# Patient Record
Sex: FEMALE | Race: WHITE | NOT HISPANIC OR LATINO | Employment: OTHER | URBAN - METROPOLITAN AREA
[De-identification: names, ages, dates, MRNs, and addresses within clinical notes are randomized per-mention and may not be internally consistent; named-entity substitution may affect disease eponyms.]

---

## 2023-07-10 ENCOUNTER — TELEPHONE (OUTPATIENT)
Dept: BEHAVIORAL HEALTH | Facility: CLINIC | Age: 18
End: 2023-07-10

## 2023-07-10 ENCOUNTER — HOSPITAL ENCOUNTER (EMERGENCY)
Facility: CLINIC | Age: 18
Discharge: PSYCHIATRIC OR SUBSTANCE USE FACILITY | End: 2023-07-12
Attending: EMERGENCY MEDICINE | Admitting: EMERGENCY MEDICINE

## 2023-07-10 DIAGNOSIS — R45.851 DEPRESSION WITH SUICIDAL IDEATION: ICD-10-CM

## 2023-07-10 DIAGNOSIS — F32.A DEPRESSION WITH SUICIDAL IDEATION: ICD-10-CM

## 2023-07-10 PROCEDURE — 90791 PSYCH DIAGNOSTIC EVALUATION: CPT

## 2023-07-10 PROCEDURE — 99285 EMERGENCY DEPT VISIT HI MDM: CPT | Mod: 25

## 2023-07-10 ASSESSMENT — ACTIVITIES OF DAILY LIVING (ADL)
ADLS_ACUITY_SCORE: 35
ADLS_ACUITY_SCORE: 35

## 2023-07-10 ASSESSMENT — COLUMBIA-SUICIDE SEVERITY RATING SCALE - C-SSRS
LETHALITY/MEDICAL DAMAGE CODE MOST RECENT POTENTIAL ATTEMPT: BEHAVIOR LIKELY TO RESULT IN INJURY BUT NOT LIKELY TO CAUSE DEATH
TOTAL  NUMBER OF ABORTED OR SELF INTERRUPTED ATTEMPTS LIFETIME: NO
2. HAVE YOU ACTUALLY HAD ANY THOUGHTS OF KILLING YOURSELF?: YES
1. HAVE YOU WISHED YOU WERE DEAD OR WISHED YOU COULD GO TO SLEEP AND NOT WAKE UP?: YES
4. HAVE YOU HAD THESE THOUGHTS AND HAD SOME INTENTION OF ACTING ON THEM?: YES
3. HAVE YOU BEEN THINKING ABOUT HOW YOU MIGHT KILL YOURSELF?: YES
2. HAVE YOU ACTUALLY HAD ANY THOUGHTS OF KILLING YOURSELF?: YES
LETHALITY/MEDICAL DAMAGE CODE MOST RECENT ACTUAL ATTEMPT: MINOR PHYSICAL DAMAGE
4. HAVE YOU HAD THESE THOUGHTS AND HAD SOME INTENTION OF ACTING ON THEM?: YES
MOST RECENT DATE: 66565
TOTAL  NUMBER OF ACTUAL ATTEMPTS PAST 3 MONTHS: 1
5. HAVE YOU STARTED TO WORK OUT OR WORKED OUT THE DETAILS OF HOW TO KILL YOURSELF? DO YOU INTEND TO CARRY OUT THIS PLAN?: YES
6. HAVE YOU EVER DONE ANYTHING, STARTED TO DO ANYTHING, OR PREPARED TO DO ANYTHING TO END YOUR LIFE?: NO
5. HAVE YOU STARTED TO WORK OUT OR WORKED OUT THE DETAILS OF HOW TO KILL YOURSELF? DO YOU INTEND TO CARRY OUT THIS PLAN?: YES
1. IN THE PAST MONTH, HAVE YOU WISHED YOU WERE DEAD OR WISHED YOU COULD GO TO SLEEP AND NOT WAKE UP?: YES
TOTAL  NUMBER OF INTERRUPTED ATTEMPTS LIFETIME: NO
ATTEMPT LIFETIME: YES
ATTEMPT PAST THREE MONTHS: YES

## 2023-07-11 ENCOUNTER — TELEPHONE (OUTPATIENT)
Dept: BEHAVIORAL HEALTH | Facility: CLINIC | Age: 18
End: 2023-07-11

## 2023-07-11 LAB
AMPHETAMINES UR QL SCN: ABNORMAL
BARBITURATES UR QL SCN: ABNORMAL
BENZODIAZ UR QL SCN: ABNORMAL
BZE UR QL SCN: ABNORMAL
CANNABINOIDS UR QL SCN: ABNORMAL
HCG UR QL: NEGATIVE
OPIATES UR QL SCN: ABNORMAL
SARS-COV-2 RNA RESP QL NAA+PROBE: NEGATIVE

## 2023-07-11 PROCEDURE — 81025 URINE PREGNANCY TEST: CPT | Performed by: SOCIAL WORKER

## 2023-07-11 PROCEDURE — 80307 DRUG TEST PRSMV CHEM ANLYZR: CPT | Performed by: EMERGENCY MEDICINE

## 2023-07-11 PROCEDURE — 87635 SARS-COV-2 COVID-19 AMP PRB: CPT | Performed by: EMERGENCY MEDICINE

## 2023-07-11 PROCEDURE — 250N000013 HC RX MED GY IP 250 OP 250 PS 637: Performed by: EMERGENCY MEDICINE

## 2023-07-11 RX ORDER — TESTOSTERONE CYPIONATE 200 MG/ML
0.3 INJECTION, SOLUTION INTRAMUSCULAR WEEKLY
COMMUNITY

## 2023-07-11 RX ADMIN — NICOTINE 1 PATCH: 7 PATCH, EXTENDED RELEASE TRANSDERMAL at 02:41

## 2023-07-11 ASSESSMENT — ACTIVITIES OF DAILY LIVING (ADL)
ADLS_ACUITY_SCORE: 35

## 2023-07-11 NOTE — ED NOTES
"Triage & Transition Services, Extended Care     Therapy Progress Note    Patient: Aayush goes by \"Aayush,\" uses he/him pronouns  Date of Service: July 11, 2023  Site of Service: [facility] ED  Patient was seen in-person.     Presenting problem:   Aayush is followed related to Long wait time for admission: 48 hours. Please see initial DEC/[facility] Crisis Assessment completed by Raquel De Los Santos on 7/10/23 for complete assessment information. Notable concerns include unrelenting suicidal ideation..     Individuals Present: Aayush & Ghulam Ames    Session start: 3:45pm  Session end: 4:10pm  Session duration in minutes: 20  Session number: 1  Anticipated number of sessions or this episode of care: 3  CPT utilized: 81558 - Psychotherapy for Crisis - 60 (30-74*) min    Current Presentation:   Patient was in room with their two boyfriends who had brought lunch. Patient was pleasant, oriented X4, cooperative and conversational.  Patient is being seen following a long admission wait time. Patient continued to endorse admission     Mental Status Exam:   Appearance: awake, alert, adequately groomed and appeared as age stated  Attitude: cooperative  Eye Contact: good  Mood: good  Affect: appropriate and in normal range  Speech: clear, coherent  Psychomotor Behavior: no evidence of tardive dyskinesia, dystonia, or tics and all within normal impression  Thought Process:  logical and goal oriented  Associations: no loose associations  Thought Content: passive suicidal ideation present, no auditory hallucinations present and no visual hallucinations present  Insight: good  Judgement: intact  Oriented to: time, person, and place  Attention Span and Concentration: intact  Recent and Remote Memory: intact    Diagnosis:   311 (F32.9) Unspecified Depressive Disorder      Therapeutic Intervention(s):   Provided active listening, unconditional positive regard, and validation. Provided positive reinforcement for progress towards goals, gains in " knowledge, and application of skills previously taught.  Explored motivation for continued treatment efforts to address current and ongoing mental health concerns. .    Treatment Objective(s) Addressed:   The focus of this session was on rapport building and processing feelings related to long wait time in the ED.     Progress Towards Goals:   Patient reports stable symptoms. Patient is making progress towards treatment goals as evidenced by being mindful of their current placement, discussing their recognition of the need to seek help. Patient is content with the wait and expressed no concerns, and feels they have been kept informed of placement status.      Case Management:   Checked in with patient regarding their wait for admission and any concerns they had in that or any other regard of their current stay in the ED. Patient was content and endorsed continued admission for mental health.      General Recommendations:   Continue to monitor for harm. Consider: Listen in a neutral, non-judgmental way. Offer reassurance    Plan:   Inpatient Mental Health: Patient is reporting current thoughts of suicide with plan to overdose. Unable to engage in safety planning.. Continued desire to admit to [facility] for safety and stabilization.      Plan for Care reviewed with Assigned Medical Provider? Yes. Provider, Dr JESSICA Zarco MD, response: positive. Also update the HUC so as to inform the RN and new attending.     Ghulam Ames MA  Licensed Mental Health Professional (LMHP), [facility]  [phone]

## 2023-07-11 NOTE — PLAN OF CARE
Karine Juarez  July 10, 2023  Plan of Care Hand-off Note     Patient Care Path: Inpatient Mental Health    Plan for Care: Pt is reporting current thoughts of suicide with plan to overdose. Unable to engage in safety planning with this writer. Desires to admit to IP MH unit for safety and stabilization.         Critical Safety Issues: none    Overview:  This patient is a child/adolescent: No    This patient has additional special visitor precautions: No    Legal Status: Voluntary    Contacts:    none    Psychiatry Consult:  Adult Psychiatry Consult requested related to awaiting IP MH admission. Psychiatry IP Consult Order Placed: Yes    Updated Attending Provider regarding plan of care.    Raquel De Los Santos, LICSW

## 2023-07-11 NOTE — ED NOTES
RN ED Mental Health Handoff Note    Voluntary    Does patient require 1:1? Yes    Hold and rights been given and documented for patient: No    Is the patient in BH scrubs? No -requested to remain in street clothes.     Has the patient been searched? Yes    Is the 15 minute observation tool up to date? Yes    Was patient issued a welcome folder? Yes    Room check completed this shift: Yes    PSS3 and [place] Assessment/Reassessment this shift:    PSS-3    Date and Time Over the past 2 weeks have you felt down, depressed, or hopeless? Over the past 2 weeks have you had thoughts of killing yourself? Have you ever attempted to kill yourself? When did this last happen? User   07/10/23 1902 yes yes no -- [facility]      C-SSRS ([place])    Date and Time Q1 Wished to be Dead (Past Month) Q2 Suicidal Thoughts (Past Month) Q3 Suicidal Thought Method Q4 Suicidal Intent without Specific Plan Q5 Suicide Intent with Specific Plan Q6 Suicide Behavior (Lifetime) Within the Past 3 Months? RETIRED: Level of Risk per Screen Screening Not Complete User   07/10/23 1902 yes yes no yes no yes -- -- -- [facility]          Behavioral status of patient: Green    Code 21 called this shift? No    Use of restraints/seclusion this shift? No    Most recent vital signs:  Temp: 98.4  F (36.9  C) Temp src: Oral BP: (!) 137/96 Pulse: 82   Resp: 20 SpO2: 98 % O2 Device: None (Room air)      Medications:  Scheduled medication compliance? Yes    PRN Meds administered this shift? No    Medications   nicotine Patch in Place ( Transdermal Patch in Place 7/11/23 0638)   nicotine (NICODERM CQ) 7 MG/24HR 24 hr patch 1 patch ( Transdermal Canceled Entry 7/11/23 0801)         ADLs    Meal Provided this shift? Yes    Hygiene items provided? No    ADLs completed? Yes    Date of last shower: Unknown    Any significant events this shift? No    Any information that would be helpful in caring for this patient?  Patient remains calm, cooperative.     Family present/updated?  No    Location of patient's belongings: With patient.     Critical Care Minutes:  Does the patient need critical care minutes documented? No

## 2023-07-11 NOTE — ED PROVIDER NOTES
Received this patient in signout from day team.  Please refer to earlier documentation.  Patient reevaluated by LINDSEY.  No changes to current plan.  Patient was set up with one of the overnight shift pending inpatient bed availability.     Yves Valentin MD  07/11/23 5539

## 2023-07-11 NOTE — ED PROVIDER NOTES
History     Chief Complaint:  Mental Health Problem       HPI   Karine Juarez prefers to be called [facility] and is a 18 year old who presents emergency department with concerns for increasing depression, suicidal thoughts and safety issues.  He reports that he has struggled for years with suicidal thoughts and depression but has not sought professional care.  He notes that recently he has had increasing thoughts and more of a struggle especially when alone with feeling as though he is safe and so presents emergency department tonight for assessment.  He has never been hospitalized or taken any medications for depression.  He reports he takes testosterone and no other medications.  He denies any recent ingestions with intent to harm or other self-harm.  He has 2 supportive boyfriends who encouraged him to come to the emergency department tonight for assessment.  He is here voluntarily.      Independent Historian:   None - Patient Only    Review of External Notes:   No outside clinic notes for review      Medications:    Testosterone injection weekly    Past Medical History:    No chronic medical problems    Past Surgical History:    No past surgical history on file.     Physical Exam   Patient Vitals for the past 24 hrs:   BP Temp Temp src Pulse Resp SpO2   07/10/23 1903 (!) 160/98 97.7  F (36.5  C) Temporal 98 20 98 %        Physical Exam  General: Adult sitting upright  Eyes: No scleral icterus  ENT: Moist mucous membranes.  CV: Normal S1S2, no murmur, rub or gallop. Regular rate and rhythm  Resp: Clear to auscultation bilaterally, no wheezes, rales or rhonchi. Normal respiratory effort.  MSK: Normal active range of motion  Skin: Warm and dry.   Neuro: Alert and oriented. Responds appropriately to all questions and commands. No focal findings appreciated.   Psych: Depressed mood.  Good eye contact.    Emergency Department Course       Emergency Department Course & Assessments:    PSS-3    Date and Time Over  the past 2 weeks have you felt down, depressed, or hopeless? Over the past 2 weeks have you had thoughts of killing yourself? Have you ever attempted to kill yourself? When did this last happen? User   07/10/23 1902 yes yes no -- [facility]      C-SSRS ([place])    Date and Time Q1 Wished to be Dead (Past Month) Q2 Suicidal Thoughts (Past Month) Q3 Suicidal Thought Method Q4 Suicidal Intent without Specific Plan Q5 Suicide Intent with Specific Plan Q6 Suicide Behavior (Lifetime) Within the Past 3 Months? RETIRED: Level of Risk per Screen Screening Not Complete User   07/10/23 1902 yes yes no yes no yes -- -- -- [facility]              Suicide assessment completed by mental health (D.E.C., LCSW, etc.)      Assessments:   I performed an exam of the patient and obtained history, as documented above.  I reassessed the patient.  No new concerns.    Independent Interpretation (X-rays, CTs, rhythm strip):  None    Consultations/Discussion of Management or Tests:  I discussed the patient with DEC  who felt the patient would benefit from inpatient psychiatric care.    Social Determinants of Health affecting care:   None    Disposition:  The patient will board in the emergency department pending bed placement. Care was signed out to Dr. Marques.     Impression & Plan      Medical Decision Making:  Karine Juarez is a 18 year old who presents for evaluation of depressed mood with suicidal ideation.  There is a history of depression in the past, not formally diagnosed, not on medications or having any intervention by psychiatry or therapy.  He has no concerns from a standpoint of physical ailments and is medically cleared for further evaluation by psychiatry.  Given the aggressive worsening of symptoms, inability to contract for safety, and possible plan the patient is stated, admission would be recommended for further mental health care.    I did have DEC evaluate the patient, their recommendations are noted in separate  note.   They as well agree with the above recommendations.  At this time the patient is stable awaiting inpatient psychiatric bed.       Diagnosis:    ICD-10-CM    1. Depression with suicidal ideation  F32.A     R45.851            7/10/2023   Aysha Aguilar MD Jonkman, Tracy Dianne, MD  07/10/23 3288

## 2023-07-11 NOTE — ED TRIAGE NOTES
"Reports worsening depression and suicidal thoughts, without a plan x 2-3 weeks. VSS. ABCs intact. Present with boyfriends. Pt states \"I don't feel safe at home with myself.\"      "

## 2023-07-11 NOTE — TELEPHONE ENCOUNTER
R: METRO +2 HOURS    [facility] Access Inpatient Bed Call Log 7/11/23 7:05AM   Intake has called facilities that have not updated their bed status within the last 12 hours.                  Adults:                   [facility] is at capacity.                 [facility] is posting 0 beds.    [phone];                [facility] is posting 0 beds. Negative covid required.                    [facility] is posting 0 beds. Negative covid required.  [phone]; Per call at 7:17am to Demetri at capacity.               [facility] is posting 0 beds.                     [facility] is posting 0 bed. Low acuity only                     [facility] is posting 0 bed -LOW acuity ONLY. Mixed unit 12+. Negative               covid- [phone]                [facility] has 0 beds posted. No aggression.  Negative Covid.    low acuity                [facility] is posting 0 beds.  Negative covid.                    [facility] ([place])  0 beds Low acuity only.  Negative covid. -   [phone]                [facility]- is posting 0 bed. Low acuity. No current aggression.               [facility] ([place]) 0 beds Low acuity only.  Negative covid. -   [phone]               [facility] is posting 1 beds. Low acuity. 72 HH hold preferred. - [phone].  Negative covid required.                [facility] ([place]) 0 beds Low acuity only.  Negative covid. -   [phone]               [facility] [place] is posting 3 Beds -     Negative covid required.   Vol only, No history of aggression, violence, or assault. No sexual offenders. No 72 HH holds.   [phone]           Pt remains on work list pending appropriate bed availability.

## 2023-07-11 NOTE — TELEPHONE ENCOUNTER
Called ED @ 04:24 to request labs    No appropriate beds are currently available within the  system. Bed search update (metro + 2 HRS) @ 3:20AM:      [facility]: @ cap per website  [facility]: @ cap per website  [facility]: @ cap per website. Per Holly @ 03:22, they are full tonight  [facility]: @ cap per website  [facility]: @ cap per website  [facility]: @ cap per website  [facility]: @ cap per website  [place] [facility]: @ cap per website  [facility]: @ cap per website  [facility]: @ cap per website  [facility]: @ cap per website  [facility]: @ cap per website  [facility]: @ cap per website  [facility]: Does not review after 10PM.    [facility]: @ cap per website  [facility]: @ cap per website  [facility]: @ cap per website  [facility]: Posting 3 beds (No hx of aggression. No sexual offenders. Voluntary patients only). Requires labwork for review    Pt remains on work list until appropriate placement is available

## 2023-07-11 NOTE — CONSULTS
"Diagnostic Evaluation Consultation  Crisis Assessment    Patient was assessed: I-Pad  Patient location: declocations: [facility] Emergency Department  Was a release of information signed: no     Referral Data and Chief Complaint  Aayush is a 18 year old, who uses he/his/him pronouns, and presents to the ED with family/friends. Patient is referred to the ED by self. Patient is presenting to the ED for the following concerns: SI.      Informed Consent and Assessment Methods     Patient is their own guardian. Writer met with patient and explained the crisis assessment process, including applicable information disclosures and limits to confidentiality, assessed understanding of the process, and obtained consent to proceed with the assessment. Patient was observed to be able to participate in the assessment as evidenced by answering of questions. Assessment methods included conducting a formal interview with patient, review of medical records, collaboration with medical staff, and obtaining relevant collateral information from family and community providers when available..     Over the course of this crisis assessment provided reassurance, offered validation and provided psychoeducation. Patient's response to interventions was appreciative.      Summary of Patient Situation     \"It's been a long process, I keep putting it off that I need to be helped\". Self reports depressed mood, hopelessness and thoughts of suicide with plan to overdose. Pt reports lack of energy, no motivation or drive to do anything.     NSSI - most recent cutting 2 wks ago.     Struggled with MH his entire life though just recently s/o encouraged him to get help.     Brief Psychosocial History     Dating 2 boyfriends since November - they moved in w/ pt 2023.   For past 1 mos has worked at [employer].     Identifies as Trans Sexual.    Bio mother  when pt was 9yo.   No relationship with bio father since age 17yo. Pt kicked out by father " "\"because I am trans\". He never believed in MH, pt internalized their struggles in response.     Significant Clinical History     No formal MH hx. States he has struggled with anxiety and depression since childhood - though father did not believe in MH and/or threatened to have him admitted to the \"mental hospital\". Pt states he instead internalized vs speaking out.     Pt reports hx of suicide attempts though is unable to recall how many \"my memory is fuzzy\". A few mos ago reports he overdoses on benadryl, fell asleep, woke up the next day - did not seek any help.     No hx of IP MH admissions.      Collateral Information    Review of epic.      Risk Assessment  Harris Suicide Severity Rating Scale Full Clinical Version: 7/10/23  Suicidal Ideation  1. Wish to be Dead (Lifetime): Yes  1. Wish to be Dead (Past 1 Month): Yes  2. Non-Specific Active Suicidal Thoughts (Lifetime): Yes  2. Non-Specific Active Suicidal Thoughts (Past 1 Month): Yes  3. Active Suicidal Ideation with any Methods (Not Plan) Without Intent to Act (Lifetime): Yes  3. Active Suicidal Ideation with any Methods (Not Plan) Without Intent to Act (Past 1 Month): Yes  4. Active Suicidal Ideation with Some Intent to Act, Without Specific Plan (Lifetime): Yes  4. Active Suicidal Ideation with Some Intent to Act, Without Specific Plan (Past 1 Month): Yes  5. Active Suicidal Ideation with Specific Plan and Intent (Lifetime): Yes  5. Active Suicidal Ideation with Specific Plan and Intent (Past 1 Month): Yes  Intensity of Ideation  Most Severe Ideation Rating (Lifetime): 3  Most Severe Ideation Rating (Past 1 Month): 2  Frequency (Lifetime): 2-5 times in week  Frequency (Past 1 Month): Daily or almost daily  Duration (Lifetime): Less than 1 hour/some of the time  Duration (Past 1 Month): 1-4 hours/a lot of time  Controllability (Lifetime): Can control thoughts with some difficulty  Controllability (Past 1 Month): Can control thoughts with some " difficulty  Deterrents (Lifetime): Deterrents probably stopped you  Deterrents (Past 1 Month): Deterrents probably stopped you  Suicidal Behavior  Actual Attempt (Lifetime): Yes  Total Number of Actual Attempts (Lifetime):  (unk, pt unable to recall)  Actual Attempt (Past 3 Months): Yes  Total Number of Actual Attempts (Past 3 Months): 1  Actual Attempt Description (Past 3 Months): overdose on benadryl, fell asleep, woke up next day  Has subject engaged in non-suicidal self-injurious behavior? (Lifetime): Yes  Has subject engaged in non-suicidal self-injurious behavior? (Past 3 Months): Yes  Interrupted Attempts (Lifetime): No  Aborted or Self-Interrupted Attempt (Lifetime): No  Preparatory Acts or Behavior (Lifetime): No  C-SSRS Risk (Lifetime/Recent)  Calculated C-SSRS Risk Score (Lifetime/Recent): High Risk    Validity of evaluation is not impacted by presenting factors during interview.   Comments regarding subjective versus objective responses to Mobile tool: none  Environmental or Psychosocial Events: helplessness/hopelessness  Chronic Risk Factors: history of suicide attempts (1+), history of abuse or neglect and history of Non-Suicidal Self Injury (NSSI)   Warning Signs: talking or writing about death, dying, or suicide, hopelessness, anxiety, agitation, unable to sleep, sleeping all the time and dramatic changes in mood  Protective Factors: intact marriage or domestic partnership, lives in a responsibly safe and stable environment, able to access care without barriers and help seeking  Interpretation of Risk Scoring, Risk Mitigation Interventions and Safety Plan: high risk, unable to engage in safety planning.      Does the patient have thoughts of harming others? No     Is the patient engaging in sexually inappropriate behavior?  no        Current Substance Abuse     Is there recent substance abuse? Occasional use of marijuana    Was a urine drug screen or blood alcohol level obtained: pending        Mental Status Exam     Affect: Flat   Appearance: Appropriate    Attention Span/Concentration: Attentive  Eye Contact: Engaged   Fund of Knowledge: Appropriate    Language /Speech Content: Fluent   Language /Speech Volume: Normal    Language /Speech Rate/Productions: Normal    Recent Memory: Intact   Remote Memory: Poor   Mood: Depressed    Orientation to Person: Yes    Orientation to Place: Yes   Orientation to Time of Day: Yes    Orientation to Date: Yes    Situation (Do they understand why they are here?): Yes    Psychomotor Behavior: Normal    Thought Content: Suicidal   Thought Form: Intact      History of commitment: No       Medication    Psychotropic medications: No  Medication changes made in the last two weeks: No       Current Care Team    Primary Care Provider: No  Psychiatrist: No  Therapist: No  : No     CTSS or ARMHS: No  ACT Team: No  Other: No      Diagnosis    311 (F32.9) Unspecified Depressive Disorder      Clinical Summary and Substantiation of Recommendations    Pt is reporting current thoughts of suicide with plan to overdose. Unable to engage in safety planning with this writer. Desires to admit to [facility] for safety and stabilization.   Admission to Inpatient Level of Care is indicated due to:    1. Patient risk of severity of behavioral health disorder is appropriate to proposed level of care as indicated by:    Imminent Risk of Harm: Current plan for suicide or serious harm to self is present  And/or:  Behavioral health disorder is present and appropriate for inpatient care with both of the following:     Severe psychiatric, behavioral or other comorbid conditions are appropriate for management at inpatient mental health as indicated by at least one of the following:   o Depressive symptoms    Severe dysfunction in daily living is present as indicated by at least one of the following:   o Extreme deterioration in social interactions, Extreme disruption in vegetative function and  Other evidence of severe dysfunction    2. Inpatient mental health services are necessary to meet patient needs and at least one of the following:  Specific condition related to admission diagnosis is present and judged likely to further improve at proposed level of care    3. Situation and expectations are appropriate for inpatient care, as indicated by one of the following:   Biopsychosocial stresses potentially contributing to clinical presentation (co morbidities) have been assessed and are absent or manageable at proposed level of care    Disposition    Recommended disposition: Inpatient Mental Health       Reviewed case and recommendations with attending provider. Attending Name: Lauren LOCKHART       Attending concurs with disposition: Yes       Patient and/or validated legal guardian concurs with disposition: Yes       Final disposition: Inpatient mental health .     Inpatient Details (if applicable):   Is patient admitted voluntarily:Yes      Patient aware of potential for transfer if there is not appropriate placement? Yes       Patient is willing to travel outside of the [facility] for placement? Yes      Behavioral Intake Notified? Yes: Date: 7/10/23 Time: 955p.       Assessment Details    Patient interview started at: 824p and completed at: 850p.     Total time spent with the patient or their family: .50 hrs      CPT code(s) utilized: 09634 - Psychotherapy for Crisis - 60 (30-74*) min       Raquel De Los Santos, LICSW, MSW, LICSW, Psychotherapist  DEC - Triage & Transition Services  Callback: [phone]

## 2023-07-11 NOTE — ED NOTES
RN ED Mental Health Handoff Note    Voluntary    Does patient require 1:1? No    Hold and rights been given and documented for patient: Yes    Is the patient in BH scrubs? No  requested to continue wearing street clothes    Has the patient been searched? Yes    Is the 15 minute observation tool up to date? Yes    Was patient issued a welcome folder? Yes    Room check completed this shift: Yes    PSS3 and [place] Assessment/Reassessment this shift:    PSS-3      Date and Time Over the past 2 weeks have you felt down, depressed, or hopeless? Over the past 2 weeks have you had thoughts of killing yourself? Have you ever attempted to kill yourself? When did this last happen? User   07/10/23 1902 yes yes no -- [facility]          C-SSRS ([place])      Date and Time Q1 Wished to be Dead (Past Month) Q2 Suicidal Thoughts (Past Month) Q3 Suicidal Thought Method Q4 Suicidal Intent without Specific Plan Q5 Suicide Intent with Specific Plan Q6 Suicide Behavior (Lifetime) Within the Past 3 Months? RETIRED: Level of Risk per Screen Screening Not Complete User   07/10/23 1902 yes yes no yes no yes -- -- -- [facility]            Behavioral status of patient: Green    Code 21 called this shift? No    Use of restraints/seclusion this shift? No    Most recent vital signs:  Temp: 97.7  F (36.5  C) Temp src: Temporal BP: (!) 160/98 Pulse: 98   Resp: 20 SpO2: 98 % O2 Device: None (Room air)      Medications:  Scheduled medication compliance? Yes    PRN Meds administered this shift? No    Medications   nicotine Patch in Place ( Transdermal Patch in Place 7/11/23 0642)   nicotine (NICODERM CQ) 7 MG/24HR 24 hr patch 1 patch (1 patch Transdermal $Patch/Med Applied 7/11/23 0781)         ADLs    Meal Provided this shift? No    Hygiene items provided? Yes    ADLs completed? Yes    Date of last shower: unknown    Any significant events this shift? No    Any information that would be helpful in caring for this patient?  Goes by he/him. Both boyfriends are  with patient    Family present/updated? Yes    Location of patient's belongings: with patient    Critical Care Minutes:  Does the patient need critical care minutes documented? No

## 2023-07-11 NOTE — TELEPHONE ENCOUNTER
S: [facility] ED , DEC  Raquel calling at 9:56 PM about a 18 year old/Female presenting with Depression, Hopelessness and SI with plan to overdose.     B: Pt arrived via Self . Presenting problem, stressors: Pt struggled with Depression and SI their whole life. Now that they're in a relationship their significant other has encouraged them to finally seek help for Mental Health.    Pt affect in ED: Flat and Tearful  Pt Dx: Unspecified Depressive Disorder.  Previous IPMH hx? No  Pt endorses SI with a plan to overdose on otc medication.   Hx of suicide attempt? Yes: Attempted overdose on Benedryl  Pt endorses SIB via scratching and needles., most recent episode two weeks ago.  Pt denies HI   Pt denies hallucinations .   Pt RARS Score: 2    Hx of aggression/violence, sexual offenses, legal concerns, Epic care plan? describe: No  Current concerns for aggression this visit? No  Does pt have a history of Civil Commitment? No  Is Pt their own guardian? Yes    Pt is not prescribed medication. Is patient medication compliant? N/A  Pt denies OP services   CD concerns: Pt does not have any concerns about their use/consumption of Thc  Acute or chronic medical concerns: No  Does Pt present with specific needs, assistive devices, or exclusionary criteria? None      Pt is ambulatory  Pt is able to perform ADLs independently      A: Pt to be reviewed for [facility] admission. Pt is Voluntary  Preferred placement: [facility] +2    COVID Symptoms: No  If yes, COVID test required   Utox: Not ordered, intake to request lab    CMP: N/A  CBC: N/A  HCG: Not ordered, intake to request lab     R: Patient cleared and ready for behavioral bed placement: Yes  Pt placed on [facility] worklist? Yes     R:  [facility] Access Inpatient Bed Call Log 7/10/2023 10:31 PM ([facility] +2)  Intake has called facilities that have not updated their bed status within the last 12 hours.               [facility] is at capacity.     [facility] is posting 0 beds.    [phone];     [employer] [facility] is posting 0 beds. Negative covid required.        [facility] is posting 0 beds. Negative covid required.  [phone]; Per call at 7:13am at capacity, callback after 9:30am to check for discharges.   [facility] is posting 0 beds.         [facility] is posting 0 bed. Low acuity only         [facility] is posting 0 bed -LOW acuity ONLY. Mixed unit 12+. Negative             covid- [phone]    [facility] has 0 beds posted. No aggression.  Negative Covid.    low acuity      Patient will remain on work list pending appropriate bed availability.

## 2023-07-12 ENCOUNTER — TELEPHONE (OUTPATIENT)
Dept: BEHAVIORAL HEALTH | Facility: CLINIC | Age: 18
End: 2023-07-12

## 2023-07-12 VITALS
RESPIRATION RATE: 18 BRPM | TEMPERATURE: 98.4 F | HEART RATE: 96 BPM | SYSTOLIC BLOOD PRESSURE: 129 MMHG | DIASTOLIC BLOOD PRESSURE: 86 MMHG | OXYGEN SATURATION: 100 %

## 2023-07-12 LAB
ALBUMIN SERPL BCG-MCNC: 4.3 G/DL (ref 3.5–5.2)
ALP SERPL-CCNC: 110 U/L (ref 35–104)
ALT SERPL W P-5'-P-CCNC: 23 U/L (ref 0–50)
ANION GAP SERPL CALCULATED.3IONS-SCNC: 11 MMOL/L (ref 7–15)
APAP SERPL-MCNC: <5 UG/ML (ref 10–30)
AST SERPL W P-5'-P-CCNC: 23 U/L (ref 0–45)
BASOPHILS # BLD AUTO: 0 10E3/UL (ref 0–0.2)
BASOPHILS NFR BLD AUTO: 0 %
BILIRUB SERPL-MCNC: 0.3 MG/DL
BUN SERPL-MCNC: 8.9 MG/DL (ref 6–20)
CALCIUM SERPL-MCNC: 9.4 MG/DL (ref 8.6–10)
CHLORIDE SERPL-SCNC: 105 MMOL/L (ref 98–107)
CREAT SERPL-MCNC: 0.7 MG/DL (ref 0.51–0.95)
DEPRECATED HCO3 PLAS-SCNC: 25 MMOL/L (ref 22–29)
EOSINOPHIL # BLD AUTO: 0.3 10E3/UL (ref 0–0.7)
EOSINOPHIL NFR BLD AUTO: 3 %
ERYTHROCYTE [DISTWIDTH] IN BLOOD BY AUTOMATED COUNT: 13.8 % (ref 10–15)
ETHANOL SERPL-MCNC: <0.01 G/DL
GFR SERPL CREATININE-BSD FRML MDRD: >90 ML/MIN/1.73M2
GLUCOSE SERPL-MCNC: 111 MG/DL (ref 70–99)
HCT VFR BLD AUTO: 45.6 % (ref 35–47)
HGB BLD-MCNC: 14.4 G/DL (ref 11.7–15.7)
IMM GRANULOCYTES # BLD: 0 10E3/UL
IMM GRANULOCYTES NFR BLD: 0 %
LYMPHOCYTES # BLD AUTO: 3.8 10E3/UL (ref 0.8–5.3)
LYMPHOCYTES NFR BLD AUTO: 35 %
MCH RBC QN AUTO: 25.7 PG (ref 26.5–33)
MCHC RBC AUTO-ENTMCNC: 31.6 G/DL (ref 31.5–36.5)
MCV RBC AUTO: 81 FL (ref 78–100)
MONOCYTES # BLD AUTO: 1.1 10E3/UL (ref 0–1.3)
MONOCYTES NFR BLD AUTO: 10 %
NEUTROPHILS # BLD AUTO: 5.7 10E3/UL (ref 1.6–8.3)
NEUTROPHILS NFR BLD AUTO: 52 %
NRBC # BLD AUTO: 0 10E3/UL
NRBC BLD AUTO-RTO: 0 /100
PLATELET # BLD AUTO: 306 10E3/UL (ref 150–450)
POTASSIUM SERPL-SCNC: 3.6 MMOL/L (ref 3.4–5.3)
PROT SERPL-MCNC: 7.7 G/DL (ref 6.4–8.3)
RBC # BLD AUTO: 5.6 10E6/UL (ref 3.8–5.2)
SALICYLATES SERPL-MCNC: <0.3 MG/DL
SODIUM SERPL-SCNC: 141 MMOL/L (ref 136–145)
WBC # BLD AUTO: 10.9 10E3/UL (ref 4–11)

## 2023-07-12 PROCEDURE — 36415 COLL VENOUS BLD VENIPUNCTURE: CPT | Performed by: EMERGENCY MEDICINE

## 2023-07-12 PROCEDURE — 80053 COMPREHEN METABOLIC PANEL: CPT | Performed by: EMERGENCY MEDICINE

## 2023-07-12 PROCEDURE — 85025 COMPLETE CBC W/AUTO DIFF WBC: CPT | Performed by: EMERGENCY MEDICINE

## 2023-07-12 PROCEDURE — 80143 DRUG ASSAY ACETAMINOPHEN: CPT | Performed by: EMERGENCY MEDICINE

## 2023-07-12 PROCEDURE — 80179 DRUG ASSAY SALICYLATE: CPT | Performed by: EMERGENCY MEDICINE

## 2023-07-12 PROCEDURE — 82077 ASSAY SPEC XCP UR&BREATH IA: CPT | Performed by: EMERGENCY MEDICINE

## 2023-07-12 ASSESSMENT — ACTIVITIES OF DAILY LIVING (ADL)
ADLS_ACUITY_SCORE: 35

## 2023-07-12 NOTE — TELEPHONE ENCOUNTER
Updated Bed Search @ 7:17 PM    Per chart review, intake can look in the [facility] +2 for placement         [facility] is at capacity.    [facility] posting 0 available beds. Phone: [phone]    [facility] posting 0 available beds. Phone: [phone]    [facility] posting 0 available beds. Phone: [phone]    [facility] posting 0 available beds. Phone: [phone]    [facility] posting 0 available beds. Phone:[phone]    [facility] posting 0 available beds. Phone: [phone]. Negative covid required.       [place] posting 0 available beds. Phone: [phone] Negative covid required. Low acuity only.     [facility] posting 0 available beds. Phone: [phone]. Need negative covid. Mixed unit 12+, low acuity    [place] posting 0 available beds. Phone: [phone]. No aggression. Negative covid required.     [facility] posting 0 available beds. Phone: [phone]    [facility] posting 0 available beds. Phone: [phone]. Covid negative.     [place] posting 0 available beds. Phone: [phone]. No current aggression. Negative covid required.     [facility] posting 0 available beds. Phone:[phone]      [facility] posting 4 available beds. Phone: [phone]    [facility] posting 3 available beds. Phone: [phone]. Voluntary patients only. Negative covid required.     Pt remains on work list pending appropriate bed placement   5

## 2023-07-12 NOTE — PHARMACY-ADMISSION MEDICATION HISTORY
Pharmacist Admission Medication History    Admission medication history is complete. The information provided in this note is only as accurate as the sources available at the time of the update.    Medication reconciliation/reorder completed by provider prior to medication history? No    Information Source(s): Patient and Prescription bottles via in-person    Changes made to PTA medication list:    Added: Testosterone    Deleted: None    Changed: None    Medication Affordability:  Not including over the counter (OTC) medications, was there a time in the past 3 months when you did not take your medications as prescribed because of cost?: Yes    Allergies reviewed with patient and updates made in EHR: yes    Medication History Completed By: Sowmya Holder RPH 7/11/2023 8:29 PM    Prior to Admission medications    Medication Sig Last Dose Taking? Auth Provider Long Term End Date   testosterone cypionate (DEPOTESTOSTERONE) 200 MG/ML injection Inject 0.3 mLs Subcutaneous once a week 7/8/2023 at - Yes Unknown, Entered By History Yes

## 2023-07-12 NOTE — TELEPHONE ENCOUNTER
4:26 PM Intake patient was accepted to [facility] under Dr. Arturo Soliman and nurse to nurse [phone].     R:  [place]  Access Inpatient Bed Call Log 7/12/23 9:00 AM ([facility] +2)  Intake has called facilities that have not updated their bed status within the last 12 hours.           Adults  [facility] is at capacity.     [facility] is posting 0 beds.  [phone].    [facility] is posting 0 beds. Negative covid required.        [facility] is posting 0 beds. Negative covid required.  [phone].  [facility] is posting 0 beds.  [facility] is posting 0 beds.   [facility] is posting 0 beds  [facility] ([facility]) is posting 0 beds.        [facility] is posting 0 bed. Low acuity only         [facility] is posting 3 bed -LOW acuity ONLY. Mixed unit 12+. Negative     covid- [phone]. Per website @7:36am    [facility] has 0 beds posted. No aggression.  Negative Covid. Low acuity    [facility] is posting 0 beds.  Negative covid.        [facility] ([place]) 3 beds Low acuity only.  Negative covid. -   [phone]. Per website @7:28am    [facility] is posting 0 bed. Low acuity. No current aggression.   [facility] ([place]) 0 beds Low acuity only.  Negative covid. -   [phone]   [facility] is posting 1 bed. Low acuity. 72 HH hold preferred. - [phone].  Negative covid required. Per call @8:54am, ! expected D/C.  [facility] ([place]) 4 beds Low acuity only.  Negative covid. -   [phone]. Per website @5:59am          [facility] in [place] is posting 3 Beds.  Negative covid 7/12 Per PPS note pt is still currently under review. Awaiting call back.    Patient remains on work list pending appropriate bed availability

## 2023-07-12 NOTE — TELEPHONE ENCOUNTER
[facility] Access Inpatient Bed Call Log 7/12/2023 12:15 AM  Intake has called facilities that have not updated their bed status within the last 12 hours.    Placement Distance: [facility] + 2 hrs                [facility] is posting 0 beds.              [facility] is posting 0 beds. [phone]              [facility] is posting 0 beds. [phone]             [facility] is posting 0 beds. [phone] - 12:16am Per Gertrude, they are capped. Call in the AM.            [facility] is posting 0 beds. [phone]             [facility] is posting 0 bed. [phone]              [facility] is posting 0 beds. [phone]             [facility] is posting 0 beds. [phone]             [facility] is posting 0 beds. [phone]             [facility] is posting 0 beds. [phone]                [facility] is posting 4 beds. Mixed unit 12+. Low acuity only.  [phone]. Requires additional labs for review             [facility] is posting 0 beds. No aggression.  [phone]             [facility] is posting 0 beds. [phone]              [facility] is posting 3 beds. [phone]. Per Yves @ 01:37 no beds in [facility] is posting 0 bed. [phone]              [facility] is posting 0 beds. Low acuity. [phone]             [facility] is posting 0 beds. 72 hr hold preferred. [phone]            [facility] is posting 4 bed.  [phone].  Per Yves @ 01:37 they have beds but require additional labs for review                 [facility] is posting 3 bed. Voluntary only- no holds/commitments, No Hx of aggression, violence, or assault. No sexual offenders. No 72 hr holds. [phone]. Per Belinda @ 01:48 they are able to review. Faxed clinical @ 01:58    Called Cassidy Wade @ 6AM - Pt is still under review    Awaiting callback from [facility]

## 2023-07-12 NOTE — ED NOTES
Triage & Transition Services, Extended Care     Karine Juarez  July 12, 2023    Aayush is followed related to Long wait time for admission: 40+ hours. Please see initial DEC Crisis Assessment completed for complete assessment information. Medical record is reviewed. While patient is in the ED, care team is working towards Learn and Demonstrate at [employer] Focused on Crisis Stabilization. Additional notes include:    12:35pm- writer entered patient's room, introduced self and explained role.  Patient stated he was just told there is a bed available at [facility] in [place], he is willing to go.  Spoke to patient about what to expect with admission, patient understood and was appreciative of the information.  Patient was calm and cooperative.  Patient said he is tired and prefers to rest, no further questions or needs.      There are not significant status changes.     Recommend  to continue care coordination with  Patient Placement Team at [phone] regarding IP MH placement.      Plan:  Inpatient Mental Health: continue to recommend IP MH admission for further safety and stabilization. Per initial consult and therapeutic check-in, patient reported thoughts of suicide with plan to overdose, he was unable to engage in safety planning. Continued to endorse need for inpatient admission    Plan for Care reviewed with Assigned Medical Provider? Yes. Provider, DHRUV Bell, response: understands plan of care     Extended Care will follow and meet with patient/family/care team as able or requested.     Eileen Duque JANETTE  [facility], Extended Care   [phone]

## 2023-07-12 NOTE — ED NOTES
IP MH Referral Acuity Rating Score (RARS)     LMHP complete at referral to IP MH, with DEC; and, daily while awaiting IP MH placement. Call score to PPS.  CRITERIA SCORING   New 72 HH and Involuntary for IP MH (not adolescent) 0/1   Boarding over 24 hours 1/1   Vulnerable adult at least 55+ with multiple co morbidities; or, Patient age 11 or under 0/1   Suicide ideation without relief of precipitating factors 1/1   Current plan for suicide 1/1   Current plan for homicide 0/1   Imminent risk or actual attempt to seriously harm another without relief of factors precipitating the attempt 0/1   Severe dysfunction in daily living (ex: complete neglect for self care, extreme disruption in vegetative function, extreme deterioration in social interactions) 0/1   Recent (last 2 weeks) or current physical aggression in the ED 0/1   Restraints or seclusion episode in ED 0/1   Verbal aggression, agitation, yelling, etc., while in the ED 0/1   Active psychosis with psychomotor agitation or catatonia 0/1   Need for constant or near constant redirection (from leaving, from others, etc).  0/1   Intrusive or disruptive behaviors 0/1   TOTAL Acuity Total Score: 3